# Patient Record
Sex: MALE | Race: WHITE | Employment: FULL TIME | URBAN - METROPOLITAN AREA
[De-identification: names, ages, dates, MRNs, and addresses within clinical notes are randomized per-mention and may not be internally consistent; named-entity substitution may affect disease eponyms.]

---

## 2023-04-24 LAB
A/G RATIO, EXTERNAL: NORMAL
ALBUMIN, EXTERNAL: 3.9
ALK PHOS, EXTERNAL: 90
ANION GAP, EXTERNAL: 7
BILI DIRECT, EXTERNAL: NORMAL
BILI TOTAL, EXTERNAL: 0.8
BUN, EXTERNAL: 20
BUN/CREATININE RATIO, EXTERNAL: NORMAL
CALCIUM, EXTERNAL: 8.8
CALCIUM, ION, EXTERNAL: NORMAL
CHLORIDE, EXTERNAL: 101
CO2, EXTERNAL: 30
CREATININE, EXTERNAL: 0.83
CREATININE, URINE: 76.44
EGFR IF AFA, EXTERNAL: NORMAL
EGFR IF NONAFRICAN AMERICAN: 91
GLOBULIN, EXTERNAL: NORMAL
GLUCOSE SER, EXTERNAL: 129
MICROALBUMIN/CREAT 24H UR: 0.3 MG/G{CREAT}
MICROALBUMIN/CREAT UR-RTO: 3.9
POTASSIUM, EXTERNAL: 4.7
PROTEIN TOT, EXTERNAL: 7.4
SGOT (AST), EXTERNAL: 30
SGPT (ALT), EXTERNAL: 23
SODIUM, EXTERNAL: 138

## 2023-08-02 ENCOUNTER — HOSPITAL ENCOUNTER (OUTPATIENT)
Age: 73
Discharge: HOME | End: 2023-08-02
Payer: MEDICARE

## 2023-08-02 LAB
ALBUMIN SERPL-MCNC: 4.2 G/DL (ref 3.5–5.2)
ALBUMIN/GLOB SERPL: 1.4 {RATIO} (ref 1–2.5)
ALP SERPL-CCNC: 87 U/L (ref 40–129)
ALT SERPL-CCNC: 22 U/L (ref 5–41)
AST SERPL-CCNC: 29 U/L
BILIRUB DIRECT SERPL-MCNC: 0.3 MG/DL
BILIRUB INDIRECT SERPL-MCNC: 0.9 MG/DL (ref 0–1)
BILIRUB SERPL-MCNC: 1.2 MG/DL (ref 0.3–1.2)
CHOLEST SERPL-MCNC: 74 MG/DL
CHOLESTEROL/HDL RATIO: 2.7
HDLC SERPL-MCNC: 27 MG/DL
LDLC SERPL CALC-MCNC: 30 MG/DL (ref 0–130)
PROT SERPL-MCNC: 7.1 G/DL (ref 6.4–8.3)
TRIGL SERPL-MCNC: 86 MG/DL

## 2023-08-02 PROCEDURE — 80061 LIPID PANEL: CPT

## 2023-08-02 PROCEDURE — 36415 COLL VENOUS BLD VENIPUNCTURE: CPT

## 2023-08-02 PROCEDURE — 80076 HEPATIC FUNCTION PANEL: CPT

## 2023-09-05 ENCOUNTER — HOSPITAL ENCOUNTER (OUTPATIENT)
Age: 73
Discharge: HOME | End: 2023-09-05
Payer: MEDICARE

## 2023-09-05 LAB
ALBUMIN SERPL-MCNC: 4.2 G/DL (ref 3.5–5.2)
ALBUMIN/GLOB SERPL: 1.4 {RATIO} (ref 1–2.5)
ALP SERPL-CCNC: 80 U/L (ref 40–129)
ALT SERPL-CCNC: 26 U/L (ref 5–41)
AST SERPL-CCNC: 32 U/L
BILIRUB DIRECT SERPL-MCNC: 0.2 MG/DL
BILIRUB INDIRECT SERPL-MCNC: 0.6 MG/DL (ref 0–1)
BILIRUB SERPL-MCNC: 0.8 MG/DL (ref 0.3–1.2)
CHOLEST SERPL-MCNC: 70 MG/DL
CHOLESTEROL/HDL RATIO: 2.5
HDLC SERPL-MCNC: 28 MG/DL
LDLC SERPL CALC-MCNC: 21 MG/DL (ref 0–130)
PROT SERPL-MCNC: 7.1 G/DL (ref 6.4–8.3)
TRIGL SERPL-MCNC: 103 MG/DL

## 2023-09-05 PROCEDURE — 36415 COLL VENOUS BLD VENIPUNCTURE: CPT

## 2023-09-05 PROCEDURE — 80061 LIPID PANEL: CPT

## 2023-09-05 PROCEDURE — 83036 HEMOGLOBIN GLYCOSYLATED A1C: CPT

## 2023-09-05 PROCEDURE — 80076 HEPATIC FUNCTION PANEL: CPT

## 2023-09-07 LAB
EST. AVERAGE GLUCOSE BLD GHB EST-MCNC: 128 MG/DL
HBA1C MFR BLD: 6.1 % (ref 4–6)

## 2023-10-09 ENCOUNTER — TELEPHONE (OUTPATIENT)
Age: 73
End: 2023-10-09

## 2023-10-09 NOTE — TELEPHONE ENCOUNTER
Patient would like the necessary labs before his 12/4/23 appointment. Patient will go [facility] when labs are ready.

## 2023-10-24 DIAGNOSIS — E11.9 DIABETES MELLITUS WITH NO COMPLICATION (HCC): ICD-10-CM

## 2023-10-24 DIAGNOSIS — E78.2 MIXED HYPERLIPIDEMIA: Primary | ICD-10-CM

## 2023-11-07 LAB — FECAL BLOOD IMMUNOCHEMICAL TEST: POSITIVE

## 2023-11-17 LAB
ESTIMATED AVERAGE GLUCOSE: NORMAL
HBA1C MFR BLD: 6.7 %

## 2023-11-22 DIAGNOSIS — E11.9 TYPE 2 DIABETES MELLITUS WITHOUT COMPLICATION, WITHOUT LONG-TERM CURRENT USE OF INSULIN (HCC): Primary | ICD-10-CM

## 2023-11-22 RX ORDER — LANCETS 33 GAUGE
1 EACH MISCELLANEOUS 2 TIMES DAILY
Qty: 100 EACH | Refills: 3 | Status: SHIPPED | OUTPATIENT
Start: 2023-11-22

## 2023-11-22 RX ORDER — LANCETS 33 GAUGE
1 EACH MISCELLANEOUS 2 TIMES DAILY
COMMUNITY
End: 2023-11-22 | Stop reason: SDUPTHER

## 2023-11-29 ENCOUNTER — HOSPITAL ENCOUNTER (OUTPATIENT)
Age: 73
Discharge: HOME | End: 2023-11-29
Payer: MEDICARE

## 2023-11-29 DIAGNOSIS — E78.2 MIXED HYPERLIPIDEMIA: ICD-10-CM

## 2023-11-29 DIAGNOSIS — E11.9 DIABETES MELLITUS WITH NO COMPLICATION (HCC): ICD-10-CM

## 2023-11-29 DIAGNOSIS — R19.5 OCCULT BLOOD POSITIVE STOOL: Primary | ICD-10-CM

## 2023-11-29 DIAGNOSIS — R19.5 OCCULT BLOOD POSITIVE STOOL: ICD-10-CM

## 2023-11-29 LAB
BASOPHILS # BLD: 0.06 K/UL (ref 0–0.2)
BASOPHILS NFR BLD: 1 % (ref 0–2)
CHOLEST SERPL-MCNC: 82 MG/DL
CHOLESTEROL/HDL RATIO: 2.7
EOSINOPHIL # BLD: 0.31 K/UL (ref 0–0.44)
EOSINOPHILS RELATIVE PERCENT: 4 % (ref 1–4)
ERYTHROCYTE [DISTWIDTH] IN BLOOD BY AUTOMATED COUNT: 14.7 % (ref 11.8–14.4)
GLUCOSE P FAST SERPL-MCNC: 139 MG/DL (ref 70–99)
HCT VFR BLD AUTO: 41.5 % (ref 40.7–50.3)
HDLC SERPL-MCNC: 30 MG/DL
HGB BLD-MCNC: 13.1 G/DL (ref 13–17)
IMM GRANULOCYTES # BLD AUTO: <0.03 K/UL (ref 0–0.3)
IMM GRANULOCYTES NFR BLD: 0 %
LDLC SERPL CALC-MCNC: 26 MG/DL (ref 0–130)
LYMPHOCYTES NFR BLD: 2.37 K/UL (ref 1.1–3.7)
LYMPHOCYTES RELATIVE PERCENT: 30 % (ref 24–43)
MCH RBC QN AUTO: 25.4 PG (ref 25.2–33.5)
MCHC RBC AUTO-ENTMCNC: 31.6 G/DL (ref 28.4–34.8)
MCV RBC AUTO: 80.6 FL (ref 82.6–102.9)
MONOCYTES NFR BLD: 0.47 K/UL (ref 0.1–1.2)
MONOCYTES NFR BLD: 6 % (ref 3–12)
NEUTROPHILS NFR BLD: 59 % (ref 36–65)
NEUTS SEG NFR BLD: 4.63 K/UL (ref 1.5–8.1)
NRBC BLD-RTO: 0 PER 100 WBC
PLATELET # BLD AUTO: 111 K/UL (ref 138–453)
PMV BLD AUTO: 8.8 FL (ref 8.1–13.5)
RBC # BLD AUTO: 5.15 M/UL (ref 4.21–5.77)
RBC # BLD: ABNORMAL 10*6/UL
TRIGL SERPL-MCNC: 128 MG/DL
WBC OTHER # BLD: 7.9 K/UL (ref 3.5–11.3)

## 2023-11-29 PROCEDURE — 36415 COLL VENOUS BLD VENIPUNCTURE: CPT

## 2023-11-29 PROCEDURE — 85025 COMPLETE CBC W/AUTO DIFF WBC: CPT

## 2023-11-29 PROCEDURE — 80061 LIPID PANEL: CPT

## 2023-11-29 PROCEDURE — 82947 ASSAY GLUCOSE BLOOD QUANT: CPT

## 2023-12-04 ENCOUNTER — OFFICE VISIT (OUTPATIENT)
Age: 73
End: 2023-12-04
Payer: MEDICARE

## 2023-12-04 VITALS
SYSTOLIC BLOOD PRESSURE: 140 MMHG | HEART RATE: 82 BPM | WEIGHT: 260 LBS | TEMPERATURE: 97.7 F | HEIGHT: 70 IN | DIASTOLIC BLOOD PRESSURE: 80 MMHG | BODY MASS INDEX: 37.22 KG/M2 | OXYGEN SATURATION: 97 %

## 2023-12-04 DIAGNOSIS — G47.33 OSA (OBSTRUCTIVE SLEEP APNEA): ICD-10-CM

## 2023-12-04 DIAGNOSIS — E66.01 MORBID OBESITY (HCC): Primary | ICD-10-CM

## 2023-12-04 DIAGNOSIS — R19.5 STOOL GUAIAC POSITIVE: ICD-10-CM

## 2023-12-04 DIAGNOSIS — M48.061 SPINAL STENOSIS OF LUMBAR REGION WITHOUT NEUROGENIC CLAUDICATION: ICD-10-CM

## 2023-12-04 DIAGNOSIS — G25.81 RESTLESS LEG SYNDROME: ICD-10-CM

## 2023-12-04 DIAGNOSIS — H90.3 SENSORINEURAL HEARING LOSS OF BOTH EARS: ICD-10-CM

## 2023-12-04 DIAGNOSIS — E78.2 MIXED HYPERLIPIDEMIA: ICD-10-CM

## 2023-12-04 DIAGNOSIS — I10 ESSENTIAL HYPERTENSION: ICD-10-CM

## 2023-12-04 DIAGNOSIS — I25.10 CORONARY ARTERY DISEASE INVOLVING NATIVE CORONARY ARTERY OF NATIVE HEART WITHOUT ANGINA PECTORIS: ICD-10-CM

## 2023-12-04 DIAGNOSIS — E11.9 TYPE 2 DIABETES MELLITUS WITHOUT COMPLICATION, WITHOUT LONG-TERM CURRENT USE OF INSULIN (HCC): ICD-10-CM

## 2023-12-04 PROCEDURE — 3077F SYST BP >= 140 MM HG: CPT | Performed by: INTERNAL MEDICINE

## 2023-12-04 PROCEDURE — 99214 OFFICE O/P EST MOD 30 MIN: CPT | Performed by: INTERNAL MEDICINE

## 2023-12-04 PROCEDURE — 3044F HG A1C LEVEL LT 7.0%: CPT | Performed by: INTERNAL MEDICINE

## 2023-12-04 PROCEDURE — 1123F ACP DISCUSS/DSCN MKR DOCD: CPT | Performed by: INTERNAL MEDICINE

## 2023-12-04 PROCEDURE — 3079F DIAST BP 80-89 MM HG: CPT | Performed by: INTERNAL MEDICINE

## 2023-12-04 RX ORDER — METOPROLOL SUCCINATE 25 MG/1
25 TABLET, EXTENDED RELEASE ORAL DAILY
COMMUNITY

## 2023-12-04 RX ORDER — EZETIMIBE 10 MG/1
10 TABLET ORAL DAILY
COMMUNITY

## 2023-12-04 RX ORDER — ROSUVASTATIN CALCIUM 20 MG/1
20 TABLET, COATED ORAL DAILY
COMMUNITY

## 2023-12-04 RX ORDER — ISOSORBIDE MONONITRATE 30 MG/1
30 TABLET, EXTENDED RELEASE ORAL DAILY
COMMUNITY

## 2023-12-04 SDOH — ECONOMIC STABILITY: FOOD INSECURITY: WITHIN THE PAST 12 MONTHS, YOU WORRIED THAT YOUR FOOD WOULD RUN OUT BEFORE YOU GOT MONEY TO BUY MORE.: NEVER TRUE

## 2023-12-04 SDOH — ECONOMIC STABILITY: INCOME INSECURITY: HOW HARD IS IT FOR YOU TO PAY FOR THE VERY BASICS LIKE FOOD, HOUSING, MEDICAL CARE, AND HEATING?: NOT VERY HARD

## 2023-12-04 SDOH — ECONOMIC STABILITY: FOOD INSECURITY: WITHIN THE PAST 12 MONTHS, THE FOOD YOU BOUGHT JUST DIDN'T LAST AND YOU DIDN'T HAVE MONEY TO GET MORE.: NEVER TRUE

## 2023-12-04 SDOH — ECONOMIC STABILITY: HOUSING INSECURITY
IN THE LAST 12 MONTHS, WAS THERE A TIME WHEN YOU DID NOT HAVE A STEADY PLACE TO SLEEP OR SLEPT IN A SHELTER (INCLUDING NOW)?: NO

## 2023-12-04 ASSESSMENT — ENCOUNTER SYMPTOMS
SINUS PAIN: 0
CONSTIPATION: 0
WHEEZING: 0
COUGH: 0
EYE PAIN: 0
BLOOD IN STOOL: 0
BACK PAIN: 0
SINUS PRESSURE: 0
VOMITING: 0
NAUSEA: 0
SORE THROAT: 0
SHORTNESS OF BREATH: 0
DIARRHEA: 0
RHINORRHEA: 0
EYE REDNESS: 0
ABDOMINAL PAIN: 0

## 2023-12-04 ASSESSMENT — PATIENT HEALTH QUESTIONNAIRE - PHQ9
1. LITTLE INTEREST OR PLEASURE IN DOING THINGS: 0
SUM OF ALL RESPONSES TO PHQ QUESTIONS 1-9: 0
SUM OF ALL RESPONSES TO PHQ9 QUESTIONS 1 & 2: 0
2. FEELING DOWN, DEPRESSED OR HOPELESS: 0
SUM OF ALL RESPONSES TO PHQ QUESTIONS 1-9: 0

## 2023-12-04 NOTE — PROGRESS NOTES
[facility]     Date of Visit:  2023  Patient Name: Nathaly Gray   Patient :  1950     CHIEF COMPLAINT:     Nathaly Gray is a 67 y.o. male who presents today for an general visit to be evaluated for the following condition(s):  Chief Complaint   Patient presents with    Discuss Labs       HISTORY OF PRESENT ILLNESS       Pt stopped wt watchers. up 13 lb. Last A1C 6.1 in Sept.  Avg . Range . Pt's wife is at [address] [place] [address]. Issues with portion control  Swimming. REVIEW OF SYSTEMS     Review of Systems   Constitutional:  Negative for chills, fever and unexpected weight change. HENT:  Negative for congestion, ear pain, hearing loss, rhinorrhea, sinus pressure, sinus pain, sneezing and sore throat. Eyes:  Negative for pain, redness and visual disturbance. Respiratory:  Negative for cough, shortness of breath and wheezing. Cardiovascular:  Negative for chest pain, palpitations and leg swelling. Gastrointestinal:  Negative for abdominal pain, blood in stool, constipation, diarrhea, nausea and vomiting. Endocrine: Negative for cold intolerance and heat intolerance. Genitourinary:  Negative for difficulty urinating, dysuria, frequency, hematuria and urgency. Musculoskeletal:  Negative for arthralgias, back pain, gait problem, joint swelling, myalgias and neck pain. Skin:  Negative for rash and wound. Allergic/Immunologic: Negative for immunocompromised state. Neurological:  Negative for dizziness, tremors, seizures, syncope, speech difficulty, weakness, light-headedness, numbness and headaches. Psychiatric/Behavioral:  Negative for confusion, hallucinations and sleep disturbance. The patient is not nervous/anxious.          REVIEWED INFORMATION      Current Outpatient Medications   Medication Sig Dispense Refill    metoprolol succinate (TOPROL XL) 25 MG extended release tablet Take 1 tablet by mouth daily      rosuvastatin (CRESTOR) 20 MG tablet Take 1

## 2024-01-05 ENCOUNTER — HOSPITAL ENCOUNTER (OUTPATIENT)
Age: 74
Discharge: HOME | End: 2024-01-05
Payer: MEDICARE

## 2024-01-05 DIAGNOSIS — E11.9 TYPE 2 DIABETES MELLITUS WITHOUT COMPLICATION, WITHOUT LONG-TERM CURRENT USE OF INSULIN (HCC): ICD-10-CM

## 2024-01-05 LAB
EST. AVERAGE GLUCOSE BLD GHB EST-MCNC: 137 MG/DL
HBA1C MFR BLD: 6.4 % (ref 4–6)

## 2024-01-05 PROCEDURE — 36415 COLL VENOUS BLD VENIPUNCTURE: CPT

## 2024-01-05 PROCEDURE — 83036 HEMOGLOBIN GLYCOSYLATED A1C: CPT

## 2024-01-29 NOTE — TELEPHONE ENCOUNTER
Miquel Cheung is calling to request a refill on the following medication(s):    Last Visit Date (If Applicable):  12/4/2023    Next Visit Date:    3/12/2024    Medication Request:  Requested Prescriptions     Pending Prescriptions Disp Refills    amLODIPine-benazepril (LOTREL) 5-20 MG per capsule [Pharmacy Med Name: amLODIPine-BENAZEPRIL 5-20 MG CAP] 90 capsule 3     Sig: TAKE ONE CAPSULE BY MOUTH DAILY

## 2024-02-01 RX ORDER — AMLODIPINE BESYLATE AND BENAZEPRIL HYDROCHLORIDE 5; 20 MG/1; MG/1
1 CAPSULE ORAL DAILY
Qty: 90 CAPSULE | Refills: 3 | Status: SHIPPED | OUTPATIENT
Start: 2024-02-01

## 2024-03-01 ENCOUNTER — TELEPHONE (OUTPATIENT)
Age: 74
End: 2024-03-01

## 2024-03-01 NOTE — TELEPHONE ENCOUNTER
Patient would like all the necessary lab work done before his next appointment on March 12, 2024. Patient will go to [facility].  Please call patient when labs are ready.

## 2024-03-12 ENCOUNTER — OFFICE VISIT (OUTPATIENT)
Age: 74
End: 2024-03-12
Payer: MEDICARE

## 2024-03-12 VITALS
BODY MASS INDEX: 37.8 KG/M2 | TEMPERATURE: 96.9 F | HEART RATE: 60 BPM | HEIGHT: 70 IN | SYSTOLIC BLOOD PRESSURE: 132 MMHG | OXYGEN SATURATION: 98 % | DIASTOLIC BLOOD PRESSURE: 80 MMHG | WEIGHT: 264 LBS

## 2024-03-12 DIAGNOSIS — G25.81 RESTLESS LEG SYNDROME: ICD-10-CM

## 2024-03-12 DIAGNOSIS — M48.061 SPINAL STENOSIS OF LUMBAR REGION WITHOUT NEUROGENIC CLAUDICATION: ICD-10-CM

## 2024-03-12 DIAGNOSIS — E78.2 MIXED HYPERLIPIDEMIA: ICD-10-CM

## 2024-03-12 DIAGNOSIS — I10 ESSENTIAL HYPERTENSION: ICD-10-CM

## 2024-03-12 DIAGNOSIS — E66.01 MORBID OBESITY (HCC): ICD-10-CM

## 2024-03-12 DIAGNOSIS — Z86.16 HISTORY OF COVID-19: ICD-10-CM

## 2024-03-12 DIAGNOSIS — E11.9 TYPE 2 DIABETES MELLITUS WITHOUT COMPLICATION, WITHOUT LONG-TERM CURRENT USE OF INSULIN (HCC): ICD-10-CM

## 2024-03-12 DIAGNOSIS — I25.10 CORONARY ARTERY DISEASE INVOLVING NATIVE CORONARY ARTERY OF NATIVE HEART WITHOUT ANGINA PECTORIS: ICD-10-CM

## 2024-03-12 DIAGNOSIS — H90.3 SENSORINEURAL HEARING LOSS OF BOTH EARS: ICD-10-CM

## 2024-03-12 DIAGNOSIS — Z00.00 MEDICARE ANNUAL WELLNESS VISIT, SUBSEQUENT: Primary | ICD-10-CM

## 2024-03-12 DIAGNOSIS — G47.33 OSA (OBSTRUCTIVE SLEEP APNEA): ICD-10-CM

## 2024-03-12 PROCEDURE — 1123F ACP DISCUSS/DSCN MKR DOCD: CPT | Performed by: INTERNAL MEDICINE

## 2024-03-12 PROCEDURE — G0439 PPPS, SUBSEQ VISIT: HCPCS | Performed by: INTERNAL MEDICINE

## 2024-03-12 PROCEDURE — 99214 OFFICE O/P EST MOD 30 MIN: CPT | Performed by: INTERNAL MEDICINE

## 2024-03-12 SDOH — ECONOMIC STABILITY: FOOD INSECURITY: WITHIN THE PAST 12 MONTHS, THE FOOD YOU BOUGHT JUST DIDN'T LAST AND YOU DIDN'T HAVE MONEY TO GET MORE.: NEVER TRUE

## 2024-03-12 SDOH — ECONOMIC STABILITY: INCOME INSECURITY: HOW HARD IS IT FOR YOU TO PAY FOR THE VERY BASICS LIKE FOOD, HOUSING, MEDICAL CARE, AND HEATING?: NOT HARD AT ALL

## 2024-03-12 SDOH — ECONOMIC STABILITY: FOOD INSECURITY: WITHIN THE PAST 12 MONTHS, YOU WORRIED THAT YOUR FOOD WOULD RUN OUT BEFORE YOU GOT MONEY TO BUY MORE.: NEVER TRUE

## 2024-03-12 ASSESSMENT — ANXIETY QUESTIONNAIRES
1. FEELING NERVOUS, ANXIOUS, OR ON EDGE: 0
5. BEING SO RESTLESS THAT IT IS HARD TO SIT STILL: 0
3. WORRYING TOO MUCH ABOUT DIFFERENT THINGS: 0
7. FEELING AFRAID AS IF SOMETHING AWFUL MIGHT HAPPEN: 0
4. TROUBLE RELAXING: 0
2. NOT BEING ABLE TO STOP OR CONTROL WORRYING: 0
GAD7 TOTAL SCORE: 0
6. BECOMING EASILY ANNOYED OR IRRITABLE: 0

## 2024-03-12 ASSESSMENT — ENCOUNTER SYMPTOMS
CONSTIPATION: 0
RHINORRHEA: 0
DIARRHEA: 0
VOMITING: 0
SINUS PRESSURE: 0
BACK PAIN: 0
SINUS PAIN: 0
BLOOD IN STOOL: 0
ABDOMINAL PAIN: 0
EYE PAIN: 0
EYE REDNESS: 0
NAUSEA: 0
SORE THROAT: 0
COUGH: 0
WHEEZING: 0
SHORTNESS OF BREATH: 0

## 2024-03-12 ASSESSMENT — PATIENT HEALTH QUESTIONNAIRE - PHQ9
SUM OF ALL RESPONSES TO PHQ QUESTIONS 1-9: 0
1. LITTLE INTEREST OR PLEASURE IN DOING THINGS: 0
SUM OF ALL RESPONSES TO PHQ QUESTIONS 1-9: 0
SUM OF ALL RESPONSES TO PHQ9 QUESTIONS 1 & 2: 0
2. FEELING DOWN, DEPRESSED OR HOPELESS: 0

## 2024-03-12 ASSESSMENT — LIFESTYLE VARIABLES
HOW OFTEN DO YOU HAVE A DRINK CONTAINING ALCOHOL: NEVER
HOW MANY STANDARD DRINKS CONTAINING ALCOHOL DO YOU HAVE ON A TYPICAL DAY: PATIENT DOES NOT DRINK

## 2024-03-12 NOTE — PROGRESS NOTES
[facility]     Date of Visit:  3/12/2024  Patient Name: Miquel Cheung   Patient :  1950     CHIEF COMPLAINT:     Miquel Cheung is a 73 y.o. male who presents today for an general visit to be evaluated for the following condition(s):  Chief Complaint   Patient presents with    Medicare AWV       HISTORY OF PRESENT ILLNESS      Patient continues to exercise daily, walking, treadmill, swimming, biking.  No chest pain or shortness of breath.  Average home blood pressure 121/65 with average blood sugar 138, low of 89 and high 167.  No issues of medications.  Wife is at assisted living at [place] [facility]. Pt visits her daily.  Only sleeping 5 hr/night.     Mid July, will be in [place] for a [place] history tour.     REVIEW OF SYSTEMS     Review of Systems   Constitutional:  Negative for chills, fever and unexpected weight change.   HENT:  Negative for congestion, ear pain, hearing loss, rhinorrhea, sinus pressure, sinus pain, sneezing and sore throat.    Eyes:  Negative for pain, redness and visual disturbance.   Respiratory:  Negative for cough, shortness of breath and wheezing.    Cardiovascular:  Negative for chest pain, palpitations and leg swelling.   Gastrointestinal:  Negative for abdominal pain, blood in stool, constipation, diarrhea, nausea and vomiting.   Endocrine: Negative for cold intolerance and heat intolerance.   Genitourinary:  Negative for difficulty urinating, dysuria, frequency, hematuria and urgency.   Musculoskeletal:  Negative for arthralgias, back pain, gait problem, joint swelling, myalgias and neck pain.   Skin:  Negative for rash and wound.   Allergic/Immunologic: Negative for immunocompromised state.   Neurological:  Negative for dizziness, tremors, seizures, syncope, speech difficulty, weakness, light-headedness, numbness and headaches.   Psychiatric/Behavioral:  Negative for confusion, hallucinations and sleep disturbance. The patient is not nervous/anxious.

## 2024-03-18 DIAGNOSIS — I10 ESSENTIAL HYPERTENSION: Primary | ICD-10-CM

## 2024-03-18 NOTE — TELEPHONE ENCOUNTER
Miquel Cheung is calling to request a refill on the following medication(s):    Last Visit Date (If Applicable):  3/12/2024    Next Visit Date:    9/19/2024    Medication Request:  Requested Prescriptions      No prescriptions requested or ordered in this encounter            Metoprolol succinate 25 mg once daily

## 2024-03-18 NOTE — TELEPHONE ENCOUNTER
metoprolol succinate (TOPROL XL) 25 MG extended release tablet  Take 1 tablet by mouth daily,     90 Day supply

## 2024-03-19 ENCOUNTER — TELEPHONE (OUTPATIENT)
Age: 74
End: 2024-03-19

## 2024-03-19 NOTE — TELEPHONE ENCOUNTER
Patient is requesting a referral for a Ophthalmologist. The patient would like a referral for [facility].      Fax  [phone]

## 2024-03-21 RX ORDER — METOPROLOL SUCCINATE 25 MG/1
25 TABLET, EXTENDED RELEASE ORAL DAILY
Qty: 90 TABLET | Refills: 3 | Status: SHIPPED | OUTPATIENT
Start: 2024-03-21

## 2024-04-02 ENCOUNTER — OFFICE VISIT (OUTPATIENT)
Age: 74
End: 2024-04-02
Payer: MEDICARE

## 2024-04-02 VITALS
BODY MASS INDEX: 38.02 KG/M2 | HEIGHT: 70 IN | HEART RATE: 80 BPM | WEIGHT: 265.6 LBS | TEMPERATURE: 97.4 F | SYSTOLIC BLOOD PRESSURE: 120 MMHG | OXYGEN SATURATION: 96 % | DIASTOLIC BLOOD PRESSURE: 60 MMHG

## 2024-04-02 DIAGNOSIS — B35.6 TINEA CRURIS: Primary | ICD-10-CM

## 2024-04-02 DIAGNOSIS — I10 ESSENTIAL HYPERTENSION: ICD-10-CM

## 2024-04-02 DIAGNOSIS — E11.9 TYPE 2 DIABETES MELLITUS WITHOUT COMPLICATION, WITHOUT LONG-TERM CURRENT USE OF INSULIN (HCC): ICD-10-CM

## 2024-04-02 PROCEDURE — 3078F DIAST BP <80 MM HG: CPT | Performed by: PHYSICIAN ASSISTANT

## 2024-04-02 PROCEDURE — 99213 OFFICE O/P EST LOW 20 MIN: CPT | Performed by: PHYSICIAN ASSISTANT

## 2024-04-02 PROCEDURE — 3074F SYST BP LT 130 MM HG: CPT | Performed by: PHYSICIAN ASSISTANT

## 2024-04-02 PROCEDURE — 3044F HG A1C LEVEL LT 7.0%: CPT | Performed by: PHYSICIAN ASSISTANT

## 2024-04-02 PROCEDURE — 1123F ACP DISCUSS/DSCN MKR DOCD: CPT | Performed by: PHYSICIAN ASSISTANT

## 2024-04-02 RX ORDER — NYSTATIN 100000 [USP'U]/G
POWDER TOPICAL
Qty: 30 G | Refills: 1 | Status: SHIPPED | OUTPATIENT
Start: 2024-04-02

## 2024-04-02 RX ORDER — CLOTRIMAZOLE 1 %
CREAM (GRAM) TOPICAL
Qty: 45 G | Refills: 1 | Status: SHIPPED | OUTPATIENT
Start: 2024-04-02 | End: 2024-04-09

## 2024-04-02 ASSESSMENT — ENCOUNTER SYMPTOMS
SORE THROAT: 0
EYE REDNESS: 0
SINUS PAIN: 0
RHINORRHEA: 0
BLOOD IN STOOL: 0
COUGH: 0
ABDOMINAL PAIN: 0
NAUSEA: 0
VOMITING: 0
SHORTNESS OF BREATH: 0
DIARRHEA: 0
CONSTIPATION: 0
BACK PAIN: 0
EYE PAIN: 0
SINUS PRESSURE: 0

## 2024-04-02 NOTE — PROGRESS NOTES
heard.     No friction rub. No gallop.   Pulmonary:      Effort: Pulmonary effort is normal. No respiratory distress.      Breath sounds: Normal breath sounds. No wheezing, rhonchi or rales.   Abdominal:      General: Bowel sounds are normal. There is no distension.      Palpations: Abdomen is soft. There is no mass.      Tenderness: There is no abdominal tenderness. There is no guarding.   Musculoskeletal:         General: No swelling or deformity. Normal range of motion.      Cervical back: Normal range of motion and neck supple. No rigidity.   Lymphadenopathy:      Cervical: No cervical adenopathy.   Skin:     General: Skin is warm.      Coloration: Skin is not jaundiced or pale.      Findings: Rash (well demarcated rash bilat groin, slightly red. consitent with tinea cruris) present. No bruising.   Neurological:      General: No focal deficit present.      Mental Status: He is alert and oriented to person, place, and time.      Cranial Nerves: No cranial nerve deficit.      Motor: No weakness.      Gait: Gait normal.      Deep Tendon Reflexes: Reflexes normal.   Psychiatric:         Mood and Affect: Mood normal.         Thought Content: Thought content normal.         The ASCVD Risk score (Premium DK, et al., 2019) failed to calculate for the following reasons:    The valid total cholesterol range is 130 to 320 mg/dL     Results for orders placed or performed during the hospital encounter of 01/05/24   Hemoglobin A1C   Result Value Ref Range    Hemoglobin A1C 6.4 (H) 4.0 - 6.0 %    Estimated Avg Glucose 137 mg/dL      ASSESSMENT/PLAN     1. Tinea cruris  -     clotrimazole (LOTRIMIN AF) 1 % cream; Apply topically 2 times daily., Disp-45 g, R-1, Normal  -     nystatin (MYCOSTATIN) 786395 UNIT/GM powder; Apply 3 times daily., Disp-30 g, R-1, Normal  Lotrimin cream to use twice a day for 10 days.  That should take care of the rash.  After acute rash resolves, he can then use nystatin powder as needed to keep the

## 2024-04-11 PROBLEM — Z00.00 MEDICARE ANNUAL WELLNESS VISIT, SUBSEQUENT: Status: RESOLVED | Noted: 2024-03-12 | Resolved: 2024-04-11

## 2024-04-26 ENCOUNTER — TELEPHONE (OUTPATIENT)
Age: 74
End: 2024-04-26

## 2024-04-26 DIAGNOSIS — E78.2 MIXED HYPERLIPIDEMIA: Primary | ICD-10-CM

## 2024-04-26 NOTE — TELEPHONE ENCOUNTER
Miquel Cheung is calling to request a refill on the following medication(s):    Last Visit Date (If Applicable):  3/12/2024    Next Visit Date:    9/19/2024    Medication Request:  Requested Prescriptions      No prescriptions requested or ordered in this encounter       Ezetimibe 10 mg one tablet daily

## 2024-04-28 RX ORDER — EZETIMIBE 10 MG/1
10 TABLET ORAL DAILY
Qty: 90 TABLET | Refills: 1 | Status: SHIPPED | OUTPATIENT
Start: 2024-04-28

## 2024-05-09 ENCOUNTER — OFFICE VISIT (OUTPATIENT)
Age: 74
End: 2024-05-09
Payer: MEDICARE

## 2024-05-09 VITALS
HEART RATE: 66 BPM | WEIGHT: 266 LBS | OXYGEN SATURATION: 96 % | TEMPERATURE: 97 F | DIASTOLIC BLOOD PRESSURE: 70 MMHG | HEIGHT: 69 IN | SYSTOLIC BLOOD PRESSURE: 132 MMHG | BODY MASS INDEX: 39.4 KG/M2

## 2024-05-09 DIAGNOSIS — B35.4 TINEA CORPORIS: Primary | ICD-10-CM

## 2024-05-09 DIAGNOSIS — E11.9 TYPE 2 DIABETES MELLITUS WITHOUT COMPLICATION, WITHOUT LONG-TERM CURRENT USE OF INSULIN (HCC): ICD-10-CM

## 2024-05-09 DIAGNOSIS — I10 ESSENTIAL HYPERTENSION: ICD-10-CM

## 2024-05-09 PROCEDURE — 3044F HG A1C LEVEL LT 7.0%: CPT | Performed by: PHYSICIAN ASSISTANT

## 2024-05-09 PROCEDURE — 99213 OFFICE O/P EST LOW 20 MIN: CPT | Performed by: PHYSICIAN ASSISTANT

## 2024-05-09 PROCEDURE — 3075F SYST BP GE 130 - 139MM HG: CPT | Performed by: PHYSICIAN ASSISTANT

## 2024-05-09 PROCEDURE — 1123F ACP DISCUSS/DSCN MKR DOCD: CPT | Performed by: PHYSICIAN ASSISTANT

## 2024-05-09 PROCEDURE — 3078F DIAST BP <80 MM HG: CPT | Performed by: PHYSICIAN ASSISTANT

## 2024-05-09 RX ORDER — CLOTRIMAZOLE AND BETAMETHASONE DIPROPIONATE 10; .64 MG/G; MG/G
CREAM TOPICAL
Qty: 45 G | Refills: 0 | Status: SHIPPED | OUTPATIENT
Start: 2024-05-09

## 2024-05-09 ASSESSMENT — ENCOUNTER SYMPTOMS
RHINORRHEA: 0
BLOOD IN STOOL: 0
DIARRHEA: 0
EYE REDNESS: 0
COUGH: 0
ABDOMINAL PAIN: 0
SORE THROAT: 0
SHORTNESS OF BREATH: 0
SINUS PAIN: 0
SINUS PRESSURE: 0
EYE PAIN: 0
NAUSEA: 0
CONSTIPATION: 0
VOMITING: 0
BACK PAIN: 0
WHEEZING: 0

## 2024-05-09 NOTE — PROGRESS NOTES
controlled with his last A1c at 6.4.  He is on diet only no medication.  3. Essential hypertension  Blood pressure stable and controlled with metoprolol succinate 25 mg daily and amlodipine benazepril 5-20  Also on Imdur 30 mg daily.  No changes.    Return for keep upcoming appointment.    COMMUNICATION:       Electronically signed by Cassie Meneses PA-C on 5/9/2024 at 11:33 AM

## 2024-05-14 DIAGNOSIS — I10 ESSENTIAL HYPERTENSION: ICD-10-CM

## 2024-05-14 DIAGNOSIS — E78.2 MIXED HYPERLIPIDEMIA: ICD-10-CM

## 2024-05-29 DIAGNOSIS — B35.4 TINEA CORPORIS: ICD-10-CM

## 2024-05-29 RX ORDER — CLOTRIMAZOLE AND BETAMETHASONE DIPROPIONATE 10; .64 MG/G; MG/G
CREAM TOPICAL
Qty: 45 G | Refills: 1 | Status: SHIPPED | OUTPATIENT
Start: 2024-05-29

## 2024-05-29 NOTE — TELEPHONE ENCOUNTER
Miquel Cheung is calling to request a refill on the following medication(s):    Last Visit Date (If Applicable):  5/9/2024    Next Visit Date:    Visit date not found    Medication Request:  Requested Prescriptions     Pending Prescriptions Disp Refills    clotrimazole-betamethasone (LOTRISONE) 1-0.05 % cream [Pharmacy Med Name: CLOTRIMAZOLE-BETAMETH 1-0.05% CREAM] 45 g 0     Sig: APPLY TO THE AFFECTED AREA(S) 2 TIMES A DAY

## 2024-06-15 DIAGNOSIS — E11.9 TYPE 2 DIABETES MELLITUS WITHOUT COMPLICATION, WITHOUT LONG-TERM CURRENT USE OF INSULIN (HCC): Primary | ICD-10-CM

## 2024-06-17 NOTE — TELEPHONE ENCOUNTER
Miquel Cheung is calling to request a refill on the following medication(s):    Last Visit Date (If Applicable):  3/12/2024    Next Visit Date:    2024    Medication Request:  Requested Prescriptions     Pending Prescriptions Disp Refills    blood glucose test strips (ONETOUCH ULTRA) strip [Pharmacy Med Name: ONETOUCH ULTRA TEST STRIP] 100 strip 3     Si each by In Vitro route every morning

## 2024-06-18 RX ORDER — BLOOD SUGAR DIAGNOSTIC
1 STRIP MISCELLANEOUS EVERY MORNING
Qty: 100 STRIP | Refills: 3 | Status: SHIPPED | OUTPATIENT
Start: 2024-06-18

## 2024-07-19 LAB
ESTIMATED AVERAGE GLUCOSE: 154
HBA1C MFR BLD: 7 %

## 2024-07-22 DIAGNOSIS — E11.9 TYPE 2 DIABETES MELLITUS WITHOUT COMPLICATION, WITHOUT LONG-TERM CURRENT USE OF INSULIN (HCC): ICD-10-CM

## 2024-09-19 ENCOUNTER — OFFICE VISIT (OUTPATIENT)
Age: 74
End: 2024-09-19
Payer: MEDICARE

## 2024-09-19 VITALS
SYSTOLIC BLOOD PRESSURE: 126 MMHG | HEART RATE: 80 BPM | DIASTOLIC BLOOD PRESSURE: 76 MMHG | BODY MASS INDEX: 39.28 KG/M2 | HEIGHT: 69 IN | OXYGEN SATURATION: 96 % | TEMPERATURE: 96.8 F

## 2024-09-19 DIAGNOSIS — E78.2 MIXED HYPERLIPIDEMIA: ICD-10-CM

## 2024-09-19 DIAGNOSIS — E11.9 ENCOUNTER FOR DIABETIC FOOT EXAM (HCC): ICD-10-CM

## 2024-09-19 DIAGNOSIS — H90.3 SENSORINEURAL HEARING LOSS OF BOTH EARS: ICD-10-CM

## 2024-09-19 DIAGNOSIS — B35.4 TINEA CORPORIS: ICD-10-CM

## 2024-09-19 DIAGNOSIS — E11.9 TYPE 2 DIABETES MELLITUS WITHOUT COMPLICATION, WITHOUT LONG-TERM CURRENT USE OF INSULIN (HCC): ICD-10-CM

## 2024-09-19 DIAGNOSIS — I10 ESSENTIAL HYPERTENSION: ICD-10-CM

## 2024-09-19 DIAGNOSIS — Z13.6 ENCOUNTER FOR ABDOMINAL AORTIC ANEURYSM SCREENING: ICD-10-CM

## 2024-09-19 DIAGNOSIS — I25.10 CORONARY ARTERY DISEASE INVOLVING NATIVE CORONARY ARTERY OF NATIVE HEART WITHOUT ANGINA PECTORIS: ICD-10-CM

## 2024-09-19 DIAGNOSIS — G47.33 OSA (OBSTRUCTIVE SLEEP APNEA): ICD-10-CM

## 2024-09-19 DIAGNOSIS — E66.01 MORBID OBESITY (HCC): Primary | ICD-10-CM

## 2024-09-19 DIAGNOSIS — Z23 IMMUNIZATION DUE: ICD-10-CM

## 2024-09-19 DIAGNOSIS — G25.81 RESTLESS LEG SYNDROME: ICD-10-CM

## 2024-09-19 DIAGNOSIS — M48.061 SPINAL STENOSIS OF LUMBAR REGION WITHOUT NEUROGENIC CLAUDICATION: ICD-10-CM

## 2024-09-19 PROCEDURE — 3078F DIAST BP <80 MM HG: CPT | Performed by: INTERNAL MEDICINE

## 2024-09-19 PROCEDURE — 90653 IIV ADJUVANT VACCINE IM: CPT | Performed by: INTERNAL MEDICINE

## 2024-09-19 PROCEDURE — 3074F SYST BP LT 130 MM HG: CPT | Performed by: INTERNAL MEDICINE

## 2024-09-19 PROCEDURE — 99214 OFFICE O/P EST MOD 30 MIN: CPT | Performed by: INTERNAL MEDICINE

## 2024-09-19 PROCEDURE — G0008 ADMIN INFLUENZA VIRUS VAC: HCPCS | Performed by: INTERNAL MEDICINE

## 2024-09-19 PROCEDURE — 76706 US ABDL AORTA SCREEN AAA: CPT | Performed by: INTERNAL MEDICINE

## 2024-09-19 PROCEDURE — 1123F ACP DISCUSS/DSCN MKR DOCD: CPT | Performed by: INTERNAL MEDICINE

## 2024-09-19 PROCEDURE — 3051F HG A1C>EQUAL 7.0%<8.0%: CPT | Performed by: INTERNAL MEDICINE

## 2024-09-19 RX ORDER — CLOTRIMAZOLE 1 %
CREAM (GRAM) TOPICAL
Qty: 60 G | Refills: 0 | Status: SHIPPED | OUTPATIENT
Start: 2024-09-19 | End: 2024-09-26

## 2024-09-19 ASSESSMENT — ENCOUNTER SYMPTOMS
VOMITING: 0
SHORTNESS OF BREATH: 0
SINUS PAIN: 0
DIARRHEA: 0
SORE THROAT: 0
BLOOD IN STOOL: 0
EYE REDNESS: 0
WHEEZING: 0
NAUSEA: 0
CONSTIPATION: 0
BACK PAIN: 0
EYE PAIN: 0
RHINORRHEA: 0
COUGH: 0
ABDOMINAL PAIN: 0
SINUS PRESSURE: 0

## 2024-09-25 RX ORDER — EZETIMIBE 10 MG/1
10 TABLET ORAL DAILY
Qty: 90 TABLET | Refills: 3 | Status: SHIPPED | OUTPATIENT
Start: 2024-09-25

## 2024-09-26 LAB
CREATININE URINE: 182.6 MG/DL
MICROALBUMIN/CREAT 24H UR: 1.41 MG/DL
MICROALBUMIN/CREAT UR-RTO: 7.7 MG/G

## 2024-09-27 DIAGNOSIS — E11.9 TYPE 2 DIABETES MELLITUS WITHOUT COMPLICATION, WITHOUT LONG-TERM CURRENT USE OF INSULIN (HCC): ICD-10-CM

## 2024-10-01 DIAGNOSIS — E11.9 TYPE 2 DIABETES MELLITUS WITHOUT COMPLICATION, WITHOUT LONG-TERM CURRENT USE OF INSULIN (HCC): ICD-10-CM

## 2024-10-01 DIAGNOSIS — E78.2 MIXED HYPERLIPIDEMIA: ICD-10-CM

## 2024-10-01 LAB
ESTIMATED AVERAGE GLUCOSE: 148
HBA1C MFR BLD: 6.8 %

## 2024-10-30 ENCOUNTER — OFFICE VISIT (OUTPATIENT)
Age: 74
End: 2024-10-30
Payer: MEDICARE

## 2024-10-30 VITALS
HEIGHT: 69 IN | WEIGHT: 265 LBS | OXYGEN SATURATION: 96 % | TEMPERATURE: 96.8 F | BODY MASS INDEX: 39.25 KG/M2 | SYSTOLIC BLOOD PRESSURE: 120 MMHG | DIASTOLIC BLOOD PRESSURE: 60 MMHG | HEART RATE: 70 BPM

## 2024-10-30 DIAGNOSIS — E78.6 LOW HDL (UNDER 40): ICD-10-CM

## 2024-10-30 DIAGNOSIS — G25.81 RESTLESS LEG SYNDROME: ICD-10-CM

## 2024-10-30 DIAGNOSIS — M48.061 SPINAL STENOSIS OF LUMBAR REGION WITHOUT NEUROGENIC CLAUDICATION: ICD-10-CM

## 2024-10-30 DIAGNOSIS — I10 ESSENTIAL HYPERTENSION: ICD-10-CM

## 2024-10-30 DIAGNOSIS — E11.9 TYPE 2 DIABETES MELLITUS WITHOUT COMPLICATION, WITHOUT LONG-TERM CURRENT USE OF INSULIN (HCC): ICD-10-CM

## 2024-10-30 DIAGNOSIS — E66.01 MORBID OBESITY: Primary | ICD-10-CM

## 2024-10-30 DIAGNOSIS — H90.3 SENSORINEURAL HEARING LOSS OF BOTH EARS: ICD-10-CM

## 2024-10-30 DIAGNOSIS — G47.33 OSA (OBSTRUCTIVE SLEEP APNEA): ICD-10-CM

## 2024-10-30 DIAGNOSIS — B35.6 TINEA CRURIS: ICD-10-CM

## 2024-10-30 DIAGNOSIS — I25.10 CORONARY ARTERY DISEASE INVOLVING NATIVE CORONARY ARTERY OF NATIVE HEART WITHOUT ANGINA PECTORIS: ICD-10-CM

## 2024-10-30 PROCEDURE — 3074F SYST BP LT 130 MM HG: CPT | Performed by: INTERNAL MEDICINE

## 2024-10-30 PROCEDURE — 3078F DIAST BP <80 MM HG: CPT | Performed by: INTERNAL MEDICINE

## 2024-10-30 PROCEDURE — 3044F HG A1C LEVEL LT 7.0%: CPT | Performed by: INTERNAL MEDICINE

## 2024-10-30 PROCEDURE — 99214 OFFICE O/P EST MOD 30 MIN: CPT | Performed by: INTERNAL MEDICINE

## 2024-10-30 PROCEDURE — 1123F ACP DISCUSS/DSCN MKR DOCD: CPT | Performed by: INTERNAL MEDICINE

## 2024-10-30 RX ORDER — ROSUVASTATIN CALCIUM 10 MG/1
10 TABLET, COATED ORAL DAILY
Qty: 90 TABLET | Refills: 1 | Status: SHIPPED | OUTPATIENT
Start: 2024-10-30

## 2024-10-30 ASSESSMENT — ENCOUNTER SYMPTOMS
SORE THROAT: 0
WHEEZING: 0
BLOOD IN STOOL: 0
SINUS PRESSURE: 0
RHINORRHEA: 0
CONSTIPATION: 0
SHORTNESS OF BREATH: 0
ABDOMINAL PAIN: 0
EYE PAIN: 0
SINUS PAIN: 0
COUGH: 0
NAUSEA: 0
BACK PAIN: 0
VOMITING: 0
EYE REDNESS: 0
DIARRHEA: 0

## 2024-10-30 NOTE — PROGRESS NOTES
[facility]     Date of Visit:  10/30/2024  Patient Name: Miquel Cheung   Patient :  1950     CHIEF COMPLAINT:     Miquel Cheung is a 73 y.o. male who presents today for an general visit to be evaluated for the following condition(s):  Chief Complaint   Patient presents with    Rash     Follow up jock itch, not getting any better       HISTORY OF PRESENT ILLNESS      Pt notes rash Right groin, better with clorimazole cream prn and nystatin powder. Exercises 35 min daily. Recumbant exercise bike. No CP or Sob.  Wife is at [place]. Labs reviewed. A1C 6.8. Taking all meds. Home BS avg 140.    Bikes 11 miles, swims twice a week as well    REVIEW OF SYSTEMS     Review of Systems   Constitutional:  Negative for chills, fever and unexpected weight change.   HENT:  Negative for congestion, ear pain, hearing loss, rhinorrhea, sinus pressure, sinus pain, sneezing and sore throat.    Eyes:  Negative for pain, redness and visual disturbance.   Respiratory:  Negative for cough, shortness of breath and wheezing.    Cardiovascular:  Negative for chest pain, palpitations and leg swelling.   Gastrointestinal:  Negative for abdominal pain, blood in stool, constipation, diarrhea, nausea and vomiting.   Endocrine: Negative for cold intolerance and heat intolerance.   Genitourinary:  Negative for difficulty urinating, dysuria, frequency, hematuria and urgency.   Musculoskeletal:  Negative for arthralgias, back pain, gait problem, joint swelling, myalgias and neck pain.   Skin:  Negative for rash and wound.   Allergic/Immunologic: Negative for immunocompromised state.   Neurological:  Negative for dizziness, tremors, seizures, syncope, speech difficulty, weakness, light-headedness, numbness and headaches.   Psychiatric/Behavioral:  Negative for confusion, hallucinations and sleep disturbance. The patient is not nervous/anxious.         REVIEWED INFORMATION      Current Outpatient Medications   Medication Sig

## 2024-11-19 ENCOUNTER — TELEPHONE (OUTPATIENT)
Age: 74
End: 2024-11-19

## 2024-11-19 NOTE — TELEPHONE ENCOUNTER
Patient is on Rosuvastatin 20 mg  prescribed by Dr Marcum . Dr Sullivan also prescribed Rosuvastatin 10 MG. Patient wants to know if he should take 30 MG ?   Patient is currently taking the 20 mg and he stopped taking the 10 mg last Friday.    Please Advise.

## 2025-01-04 DIAGNOSIS — I10 ESSENTIAL HYPERTENSION: Primary | ICD-10-CM

## 2025-01-06 NOTE — TELEPHONE ENCOUNTER
Miquel Cheung is calling to request a refill on the following medication(s):    Last Visit Date (If Applicable):  10/30/2024    Next Visit Date:    2/5/2025    Medication Request:  Requested Prescriptions     Pending Prescriptions Disp Refills    amLODIPine-benazepril (LOTREL) 5-20 MG per capsule [Pharmacy Med Name: amLODIPine-BENAZEPRIL 5-20 MG CAP] 90 capsule 3     Sig: Take 1 capsule by mouth daily

## 2025-01-07 RX ORDER — AMLODIPINE AND BENAZEPRIL HYDROCHLORIDE 5; 20 MG/1; MG/1
1 CAPSULE ORAL DAILY
Qty: 90 CAPSULE | Refills: 3 | Status: SHIPPED | OUTPATIENT
Start: 2025-01-07

## 2025-01-15 ENCOUNTER — OFFICE VISIT (OUTPATIENT)
Age: 75
End: 2025-01-15
Payer: MEDICARE

## 2025-01-15 VITALS
SYSTOLIC BLOOD PRESSURE: 125 MMHG | BODY MASS INDEX: 39.66 KG/M2 | WEIGHT: 267.8 LBS | HEIGHT: 69 IN | HEART RATE: 81 BPM | DIASTOLIC BLOOD PRESSURE: 70 MMHG | OXYGEN SATURATION: 94 % | TEMPERATURE: 96.8 F

## 2025-01-15 DIAGNOSIS — E11.9 TYPE 2 DIABETES MELLITUS WITHOUT COMPLICATION, WITHOUT LONG-TERM CURRENT USE OF INSULIN (HCC): ICD-10-CM

## 2025-01-15 DIAGNOSIS — M25.562 LEFT ANTERIOR KNEE PAIN: Primary | ICD-10-CM

## 2025-01-15 DIAGNOSIS — I10 ESSENTIAL HYPERTENSION: ICD-10-CM

## 2025-01-15 DIAGNOSIS — E66.01 MORBID (SEVERE) OBESITY DUE TO EXCESS CALORIES: ICD-10-CM

## 2025-01-15 PROCEDURE — 3074F SYST BP LT 130 MM HG: CPT | Performed by: PHYSICIAN ASSISTANT

## 2025-01-15 PROCEDURE — 1123F ACP DISCUSS/DSCN MKR DOCD: CPT | Performed by: PHYSICIAN ASSISTANT

## 2025-01-15 PROCEDURE — 3078F DIAST BP <80 MM HG: CPT | Performed by: PHYSICIAN ASSISTANT

## 2025-01-15 PROCEDURE — 99214 OFFICE O/P EST MOD 30 MIN: CPT | Performed by: PHYSICIAN ASSISTANT

## 2025-01-15 RX ORDER — MELOXICAM 15 MG/1
15 TABLET ORAL DAILY
Qty: 30 TABLET | Refills: 1 | Status: SHIPPED | OUTPATIENT
Start: 2025-01-15

## 2025-01-15 RX ORDER — LANCETS 33 GAUGE
1 EACH MISCELLANEOUS DAILY
Qty: 100 EACH | Refills: 3 | Status: SHIPPED | OUTPATIENT
Start: 2025-01-15

## 2025-01-15 SDOH — ECONOMIC STABILITY: FOOD INSECURITY: WITHIN THE PAST 12 MONTHS, THE FOOD YOU BOUGHT JUST DIDN'T LAST AND YOU DIDN'T HAVE MONEY TO GET MORE.: NEVER TRUE

## 2025-01-15 SDOH — ECONOMIC STABILITY: FOOD INSECURITY: WITHIN THE PAST 12 MONTHS, YOU WORRIED THAT YOUR FOOD WOULD RUN OUT BEFORE YOU GOT MONEY TO BUY MORE.: NEVER TRUE

## 2025-01-15 ASSESSMENT — PATIENT HEALTH QUESTIONNAIRE - PHQ9
SUM OF ALL RESPONSES TO PHQ QUESTIONS 1-9: 0
1. LITTLE INTEREST OR PLEASURE IN DOING THINGS: NOT AT ALL
SUM OF ALL RESPONSES TO PHQ QUESTIONS 1-9: 0
SUM OF ALL RESPONSES TO PHQ QUESTIONS 1-9: 0
2. FEELING DOWN, DEPRESSED OR HOPELESS: NOT AT ALL
SUM OF ALL RESPONSES TO PHQ9 QUESTIONS 1 & 2: 0
SUM OF ALL RESPONSES TO PHQ QUESTIONS 1-9: 0

## 2025-01-15 ASSESSMENT — ENCOUNTER SYMPTOMS
ABDOMINAL PAIN: 0
SHORTNESS OF BREATH: 0
SINUS PRESSURE: 0
WHEEZING: 0
CONSTIPATION: 0
BLOOD IN STOOL: 0
NAUSEA: 0
VOMITING: 0
SINUS PAIN: 0
EYE PAIN: 0
COUGH: 0
SORE THROAT: 0
BACK PAIN: 0
DIARRHEA: 0
RHINORRHEA: 0
EYE REDNESS: 0

## 2025-01-15 NOTE — PROGRESS NOTES
Onset    Parkinson's Disease Mother     Parkinsonism Father         PHYSICAL EXAM     /70 (Site: Left Upper Arm, Position: Sitting, Cuff Size: Large Adult)   Pulse 81   Temp 96.8 °F (36 °C) (Temporal)   Ht 1.753 m (5' 9\")   Wt 121.5 kg (267 lb 12.8 oz)   SpO2 94%   BMI 39.55 kg/m²    Physical Exam  Vitals reviewed.   Constitutional:       General: He is not in acute distress.     Appearance: Normal appearance.   HENT:      Head: Normocephalic and atraumatic.      Right Ear: Tympanic membrane and external ear normal.      Left Ear: Tympanic membrane and external ear normal.      Nose: Nose normal.      Mouth/Throat:      Mouth: Mucous membranes are moist.      Pharynx: No oropharyngeal exudate or posterior oropharyngeal erythema.   Eyes:      Extraocular Movements: Extraocular movements intact.      Conjunctiva/sclera: Conjunctivae normal.      Pupils: Pupils are equal, round, and reactive to light.   Neck:      Vascular: No carotid bruit.   Cardiovascular:      Rate and Rhythm: Normal rate and regular rhythm.      Pulses: Normal pulses.      Heart sounds: No murmur heard.     No friction rub. No gallop.   Pulmonary:      Effort: Pulmonary effort is normal. No respiratory distress.      Breath sounds: Normal breath sounds. No wheezing, rhonchi or rales.   Abdominal:      General: Bowel sounds are normal. There is no distension.      Palpations: Abdomen is soft. There is no mass.      Tenderness: There is no abdominal tenderness. There is no guarding.   Musculoskeletal:         General: Swelling present. No tenderness or deformity. Normal range of motion.      Cervical back: Normal range of motion and neck supple. No rigidity.      Right lower leg: No edema (slight swelling anterior left knee).      Left lower leg: No edema.      Comments: Crepitus left knee   Lymphadenopathy:      Cervical: No cervical adenopathy.   Skin:     General: Skin is warm.      Coloration: Skin is not jaundiced or pale.

## 2025-01-15 NOTE — TELEPHONE ENCOUNTER
Miquel Cheung is calling to request a refill on the following medication(s):    Last Visit Date (If Applicable):  10/30/2024    Next Visit Date:    3/24/2025    Medication Request:  Requested Prescriptions     Pending Prescriptions Disp Refills    Lancets (ONETOUCH DELICA PLUS [id number]) MISC [Pharmacy Med Name: ONETOUCH DELICA PLUS 33G LANCT] 100 each 3     Sig: USE AS DIRECTED TO TEST BLOOD SUGAR TWICE DAILY (MORNING AND AT BEDTIME)

## 2025-01-17 DIAGNOSIS — M25.562 LEFT ANTERIOR KNEE PAIN: ICD-10-CM

## 2025-01-30 DIAGNOSIS — E78.6 LOW HDL (UNDER 40): ICD-10-CM

## 2025-01-30 DIAGNOSIS — I10 ESSENTIAL HYPERTENSION: ICD-10-CM

## 2025-01-30 DIAGNOSIS — E11.9 TYPE 2 DIABETES MELLITUS WITHOUT COMPLICATION, WITHOUT LONG-TERM CURRENT USE OF INSULIN (HCC): ICD-10-CM

## 2025-02-04 ENCOUNTER — OFFICE VISIT (OUTPATIENT)
Age: 75
End: 2025-02-04
Payer: MEDICARE

## 2025-02-04 VITALS
HEIGHT: 69 IN | WEIGHT: 268.6 LBS | TEMPERATURE: 96.9 F | DIASTOLIC BLOOD PRESSURE: 75 MMHG | OXYGEN SATURATION: 95 % | BODY MASS INDEX: 39.78 KG/M2 | SYSTOLIC BLOOD PRESSURE: 125 MMHG | HEART RATE: 65 BPM

## 2025-02-04 DIAGNOSIS — I10 ESSENTIAL HYPERTENSION: ICD-10-CM

## 2025-02-04 DIAGNOSIS — E11.9 TYPE 2 DIABETES MELLITUS WITHOUT COMPLICATION, WITHOUT LONG-TERM CURRENT USE OF INSULIN (HCC): ICD-10-CM

## 2025-02-04 DIAGNOSIS — R21 RASH: Primary | ICD-10-CM

## 2025-02-04 PROCEDURE — 99213 OFFICE O/P EST LOW 20 MIN: CPT | Performed by: PHYSICIAN ASSISTANT

## 2025-02-04 PROCEDURE — 3074F SYST BP LT 130 MM HG: CPT | Performed by: PHYSICIAN ASSISTANT

## 2025-02-04 PROCEDURE — 3078F DIAST BP <80 MM HG: CPT | Performed by: PHYSICIAN ASSISTANT

## 2025-02-04 PROCEDURE — 1123F ACP DISCUSS/DSCN MKR DOCD: CPT | Performed by: PHYSICIAN ASSISTANT

## 2025-02-04 RX ORDER — PREDNISONE 5 MG/1
TABLET ORAL
Qty: 21 TABLET | Refills: 0 | Status: SHIPPED | OUTPATIENT
Start: 2025-02-04

## 2025-02-04 ASSESSMENT — ENCOUNTER SYMPTOMS
DIARRHEA: 0
SINUS PRESSURE: 0
WHEEZING: 0
RHINORRHEA: 0
SINUS PAIN: 0
EYE PAIN: 0
SHORTNESS OF BREATH: 0
CONSTIPATION: 0
SORE THROAT: 0
VOMITING: 0
NAUSEA: 0
BACK PAIN: 0
BLOOD IN STOOL: 0
EYE REDNESS: 0
COUGH: 0
ABDOMINAL PAIN: 0

## 2025-02-04 NOTE — PROGRESS NOTES
There is no guarding.   Musculoskeletal:         General: No swelling or deformity. Normal range of motion.      Cervical back: Normal range of motion and neck supple. No rigidity.   Lymphadenopathy:      Cervical: No cervical adenopathy.   Skin:     General: Skin is warm.      Coloration: Skin is not jaundiced or pale.      Findings: Rash (faint rash but mainly excoriations on legs, upper arms, upper chest) present. No bruising.   Neurological:      General: No focal deficit present.      Mental Status: He is alert and oriented to person, place, and time.      Cranial Nerves: No cranial nerve deficit.      Motor: No weakness.      Gait: Gait normal.      Deep Tendon Reflexes: Reflexes normal.   Psychiatric:         Mood and Affect: Mood normal.         Thought Content: Thought content normal.         The ASCVD Risk score (Niharika DK, et al., 2019) failed to calculate for the following reasons:    The valid total cholesterol range is 130 to 320 mg/dL     Results for orders placed or performed in visit on 10/01/24   Hemoglobin A1C   Result Value Ref Range    Hemoglobin A1C 6.8 %    Estimated Avg Glucose 148       ASSESSMENT/PLAN     1. Rash  -     predniSONE (DELTASONE) 5 MG tablet; 6 tabs day 1, 5 tabs day 2, 4 tabs day 3, 3 tabs day 4, 2 tabs day 5, 1 tab day 6, Disp-21 tablet, R-0Normal  -     External Referral To Dermatology  Does not look like scabies.  His wife does not have the same rash.  Does not look like a drug reaction rash.  Going to cover him with Medrol Dosepak for the dermatitis.  He requested referral to dermatology which is fine.  2. Type 2 diabetes mellitus without complication, without long-term current use of insulin ([facility])  Recent A1c of 6.8.  Monitor blood sugars while on the prednisone.  He is not on medication but is diet controlled.  3. Essential hypertension  Patient monitors his blood pressure at home.  It is stable here in the office.  Continue amlodipine benazepril 5-20 daily and

## 2025-02-24 DIAGNOSIS — I10 ESSENTIAL HYPERTENSION: ICD-10-CM

## 2025-02-24 NOTE — TELEPHONE ENCOUNTER
Miquel Cheung is calling to request a refill on the following medication(s):    Last Visit Date (If Applicable):  2/4/2025    Next Visit Date:    3/24/2025    Medication Request:  Requested Prescriptions     Pending Prescriptions Disp Refills    metoprolol succinate (TOPROL XL) 25 MG extended release tablet 90 tablet 3     Sig: Take 1 tablet by mouth daily

## 2025-02-26 RX ORDER — METOPROLOL SUCCINATE 25 MG/1
25 TABLET, EXTENDED RELEASE ORAL DAILY
Qty: 90 TABLET | Refills: 3 | Status: SHIPPED | OUTPATIENT
Start: 2025-02-26

## 2025-03-24 ENCOUNTER — OFFICE VISIT (OUTPATIENT)
Age: 75
End: 2025-03-24
Payer: MEDICARE

## 2025-03-24 VITALS
SYSTOLIC BLOOD PRESSURE: 126 MMHG | WEIGHT: 268 LBS | HEART RATE: 72 BPM | TEMPERATURE: 96.9 F | BODY MASS INDEX: 39.69 KG/M2 | OXYGEN SATURATION: 97 % | HEIGHT: 69 IN | DIASTOLIC BLOOD PRESSURE: 66 MMHG

## 2025-03-24 DIAGNOSIS — G25.81 RESTLESS LEG SYNDROME: ICD-10-CM

## 2025-03-24 DIAGNOSIS — E66.01 MORBID OBESITY: ICD-10-CM

## 2025-03-24 DIAGNOSIS — I25.10 CORONARY ARTERY DISEASE INVOLVING NATIVE CORONARY ARTERY OF NATIVE HEART WITHOUT ANGINA PECTORIS: ICD-10-CM

## 2025-03-24 DIAGNOSIS — G47.33 OSA (OBSTRUCTIVE SLEEP APNEA): ICD-10-CM

## 2025-03-24 DIAGNOSIS — E78.2 MIXED HYPERLIPIDEMIA: ICD-10-CM

## 2025-03-24 DIAGNOSIS — E11.9 TYPE 2 DIABETES MELLITUS WITHOUT COMPLICATION, WITHOUT LONG-TERM CURRENT USE OF INSULIN: ICD-10-CM

## 2025-03-24 DIAGNOSIS — B35.6 TINEA CRURIS: ICD-10-CM

## 2025-03-24 DIAGNOSIS — Z00.00 MEDICARE ANNUAL WELLNESS VISIT, SUBSEQUENT: Primary | ICD-10-CM

## 2025-03-24 DIAGNOSIS — I10 ESSENTIAL HYPERTENSION: ICD-10-CM

## 2025-03-24 DIAGNOSIS — E78.6 LOW HDL (UNDER 40): ICD-10-CM

## 2025-03-24 DIAGNOSIS — H90.3 SENSORINEURAL HEARING LOSS OF BOTH EARS: ICD-10-CM

## 2025-03-24 DIAGNOSIS — M48.061 SPINAL STENOSIS OF LUMBAR REGION WITHOUT NEUROGENIC CLAUDICATION: ICD-10-CM

## 2025-03-24 PROCEDURE — 1123F ACP DISCUSS/DSCN MKR DOCD: CPT | Performed by: INTERNAL MEDICINE

## 2025-03-24 PROCEDURE — G0439 PPPS, SUBSEQ VISIT: HCPCS | Performed by: INTERNAL MEDICINE

## 2025-03-24 PROCEDURE — 3078F DIAST BP <80 MM HG: CPT | Performed by: INTERNAL MEDICINE

## 2025-03-24 PROCEDURE — 3074F SYST BP LT 130 MM HG: CPT | Performed by: INTERNAL MEDICINE

## 2025-03-24 PROCEDURE — 99214 OFFICE O/P EST MOD 30 MIN: CPT | Performed by: INTERNAL MEDICINE

## 2025-03-24 RX ORDER — CLOBETASOL PROPIONATE 0.5 MG/G
CREAM TOPICAL
COMMUNITY
Start: 2025-02-07

## 2025-03-24 RX ORDER — CLOTRIMAZOLE AND BETAMETHASONE DIPROPIONATE 10; .64 MG/G; MG/G
CREAM TOPICAL
Qty: 45 G | Refills: 1 | Status: SHIPPED | OUTPATIENT
Start: 2025-03-24

## 2025-03-24 RX ORDER — KETOCONAZOLE 20 MG/G
CREAM TOPICAL
COMMUNITY
Start: 2025-03-21

## 2025-03-24 SDOH — ECONOMIC STABILITY: FOOD INSECURITY: WITHIN THE PAST 12 MONTHS, THE FOOD YOU BOUGHT JUST DIDN'T LAST AND YOU DIDN'T HAVE MONEY TO GET MORE.: NEVER TRUE

## 2025-03-24 SDOH — ECONOMIC STABILITY: FOOD INSECURITY: WITHIN THE PAST 12 MONTHS, YOU WORRIED THAT YOUR FOOD WOULD RUN OUT BEFORE YOU GOT MONEY TO BUY MORE.: NEVER TRUE

## 2025-03-24 ASSESSMENT — PATIENT HEALTH QUESTIONNAIRE - PHQ9
SUM OF ALL RESPONSES TO PHQ QUESTIONS 1-9: 0
2. FEELING DOWN, DEPRESSED OR HOPELESS: NOT AT ALL
SUM OF ALL RESPONSES TO PHQ QUESTIONS 1-9: 0
1. LITTLE INTEREST OR PLEASURE IN DOING THINGS: NOT AT ALL

## 2025-03-24 ASSESSMENT — ENCOUNTER SYMPTOMS
BACK PAIN: 0
SINUS PAIN: 0
SINUS PRESSURE: 0
CONSTIPATION: 0
EYE REDNESS: 0
BLOOD IN STOOL: 0
EYE PAIN: 0
WHEEZING: 0
NAUSEA: 0
COUGH: 0
SORE THROAT: 0
RHINORRHEA: 0
VOMITING: 0
SHORTNESS OF BREATH: 0
ABDOMINAL PAIN: 0
DIARRHEA: 0

## 2025-03-24 NOTE — PROGRESS NOTES
[facility]     Date of Visit:  3/24/2025  Patient Name: Miquel Cheung   Patient :  1950     CHIEF COMPLAINT:     Miquel Cheung is a 74 y.o. male who presents today for an general visit to be evaluated for the following condition(s):  Chief Complaint   Patient presents with    Medicare AWV       HISTORY OF PRESENT ILLNESS    Home blood pressures over the last 115 days averaging 125/66 with heart rate of 59.  Average blood sugar during that same time period is 140.  Using a variety of creams [facility].  Exercises 35 minutes/day usually biking or walking and he will resume swimming later on.  Sleeping well with CPAP. No CP or SOB. Taking all meds. No palpitations.     Wife at [place] for 2 years. Pt is there daily. Pt volunteers at the [facility]       REVIEW OF SYSTEMS     Review of Systems   Constitutional:  Negative for chills, fever and unexpected weight change.   HENT:  Negative for congestion, ear pain, hearing loss, rhinorrhea, sinus pressure, sinus pain, sneezing and sore throat.    Eyes:  Negative for pain, redness and visual disturbance.   Respiratory:  Negative for cough, shortness of breath and wheezing.    Cardiovascular:  Negative for chest pain, palpitations and leg swelling.   Gastrointestinal:  Negative for abdominal pain, blood in stool, constipation, diarrhea, nausea and vomiting.   Endocrine: Negative for cold intolerance and heat intolerance.   Genitourinary:  Negative for difficulty urinating, dysuria, frequency, hematuria and urgency.   Musculoskeletal:  Negative for arthralgias, back pain, gait problem, joint swelling, myalgias and neck pain.   Skin:  Negative for rash and wound.   Allergic/Immunologic: Negative for immunocompromised state.   Neurological:  Negative for dizziness, tremors, seizures, syncope, speech difficulty, weakness, light-headedness, numbness and headaches.   Psychiatric/Behavioral:  Negative for confusion, hallucinations and

## 2025-04-21 ENCOUNTER — OFFICE VISIT (OUTPATIENT)
Age: 75
End: 2025-04-21
Payer: MEDICARE

## 2025-04-21 VITALS
TEMPERATURE: 97.2 F | HEART RATE: 77 BPM | OXYGEN SATURATION: 97 % | BODY MASS INDEX: 39.04 KG/M2 | WEIGHT: 263.6 LBS | HEIGHT: 69 IN

## 2025-04-21 DIAGNOSIS — R58 ECCHYMOSIS: Primary | ICD-10-CM

## 2025-04-21 DIAGNOSIS — I10 ESSENTIAL HYPERTENSION: ICD-10-CM

## 2025-04-21 DIAGNOSIS — E11.9 TYPE 2 DIABETES MELLITUS WITHOUT COMPLICATION, WITHOUT LONG-TERM CURRENT USE OF INSULIN (HCC): ICD-10-CM

## 2025-04-21 PROCEDURE — 99213 OFFICE O/P EST LOW 20 MIN: CPT | Performed by: PHYSICIAN ASSISTANT

## 2025-04-21 PROCEDURE — 1123F ACP DISCUSS/DSCN MKR DOCD: CPT | Performed by: PHYSICIAN ASSISTANT

## 2025-04-21 ASSESSMENT — ENCOUNTER SYMPTOMS
CONSTIPATION: 0
RHINORRHEA: 1
BLOOD IN STOOL: 0
DIARRHEA: 0
BACK PAIN: 0
COLOR CHANGE: 1
VOMITING: 0
SINUS PAIN: 0
COUGH: 0
WHEEZING: 0
ABDOMINAL PAIN: 0
EYE REDNESS: 0
SORE THROAT: 0
NAUSEA: 0
EYE PAIN: 0
SINUS PRESSURE: 0
SHORTNESS OF BREATH: 0

## 2025-04-21 NOTE — PROGRESS NOTES
[facility]     Date of Visit:  2025  Patient Name: Miquel Cheung   Patient :  1950     CHIEF COMPLAINT:     Miquel Cheung is a 74 y.o. male who presents today for an general visit to be evaluated for the following condition(s):  Chief Complaint   Patient presents with    Bleeding/Bruising     Left leg       HISTORY OF PRESENT ILLNESS      Patient has had 1 week of a bruise left inner thigh and posterior thigh.  He does not recall specific injury.  It is a little sore to touch.  No falls.    Also has had a clear runny nose for several weeks.  Does not feel sick.  No sinus pain or pressure.  Some postnasal drip.  No shortness of breath.  No wheezing.  Does not feel like he has an infection.    Home blood pressures have been averaging in the 120s systolic.  Also blood sugar averaging a little bit higher lately in the 140s but he has been eating more rice lately.  He is exercising regularly.  He tried to cut out snacking.  He has lost some weight.    REVIEW OF SYSTEMS     Review of Systems   Constitutional:  Negative for chills, fever and unexpected weight change.   HENT:  Positive for postnasal drip and rhinorrhea. Negative for congestion, ear pain, hearing loss, sinus pressure, sinus pain, sneezing and sore throat.    Eyes:  Negative for pain, redness and visual disturbance.   Respiratory:  Negative for cough, shortness of breath and wheezing.    Cardiovascular:  Negative for chest pain, palpitations and leg swelling.   Gastrointestinal:  Negative for abdominal pain, blood in stool, constipation, diarrhea, nausea and vomiting.   Endocrine: Negative for cold intolerance and heat intolerance.   Genitourinary:  Negative for difficulty urinating, dysuria, frequency, hematuria and urgency.   Musculoskeletal:  Negative for arthralgias, back pain, gait problem, joint swelling, myalgias and neck pain.   Skin:  Positive for color change. Negative for rash and wound.   Allergic/Immunologic:

## 2025-04-22 ENCOUNTER — RESULTS FOLLOW-UP (OUTPATIENT)
Age: 75
End: 2025-04-22

## 2025-04-22 DIAGNOSIS — R58 ECCHYMOSIS: ICD-10-CM

## 2025-05-12 RX ORDER — METOPROLOL SUCCINATE 50 MG/1
TABLET, EXTENDED RELEASE ORAL
Qty: 90 TABLET | Refills: 3 | Status: SHIPPED | OUTPATIENT
Start: 2025-05-12 | End: 2025-05-13

## 2025-05-13 RX ORDER — METOPROLOL SUCCINATE 50 MG/1
25 TABLET, EXTENDED RELEASE ORAL DAILY
Qty: 45 TABLET | Refills: 3 | Status: SHIPPED | OUTPATIENT
Start: 2025-05-13

## 2025-05-13 NOTE — TELEPHONE ENCOUNTER
Medication clarification. When Metoprolol was last prescribed on 4/21/25 patient was taking 25mg daily. The Metoprolol that came through today was 50mg with no directions. Spoke to patient, who confirmed that he is taking 25mg daily.

## 2025-06-27 ENCOUNTER — RESULTS FOLLOW-UP (OUTPATIENT)
Age: 75
End: 2025-06-27

## 2025-06-27 DIAGNOSIS — E78.2 MIXED HYPERLIPIDEMIA: ICD-10-CM

## 2025-06-27 DIAGNOSIS — I10 ESSENTIAL HYPERTENSION: ICD-10-CM

## 2025-06-27 DIAGNOSIS — E87.5 HYPERKALEMIA: Primary | ICD-10-CM

## 2025-06-27 DIAGNOSIS — E11.9 TYPE 2 DIABETES MELLITUS WITHOUT COMPLICATION, WITHOUT LONG-TERM CURRENT USE OF INSULIN (HCC): ICD-10-CM

## 2025-06-28 DIAGNOSIS — E11.9 TYPE 2 DIABETES MELLITUS WITHOUT COMPLICATION, WITHOUT LONG-TERM CURRENT USE OF INSULIN (HCC): ICD-10-CM

## 2025-06-30 RX ORDER — BLOOD SUGAR DIAGNOSTIC
1 STRIP MISCELLANEOUS EVERY MORNING
Qty: 100 STRIP | Refills: 3 | Status: SHIPPED | OUTPATIENT
Start: 2025-06-30

## 2025-06-30 NOTE — TELEPHONE ENCOUNTER
Miquel Cheung is calling to request a refill on the following medication(s):    Last Visit Date (If Applicable):  2025    Next Visit Date:    2025    Medication Request:  Requested Prescriptions     Pending Prescriptions Disp Refills    blood glucose test strips (ONETOUCH ULTRA TEST) strip [Pharmacy Med Name: ONETOUCH ULTRA TEST STRIP] 100 strip 2     Si each by In Vitro route every morning

## 2025-07-08 ENCOUNTER — RESULTS FOLLOW-UP (OUTPATIENT)
Age: 75
End: 2025-07-08

## 2025-07-08 DIAGNOSIS — E87.5 HYPERKALEMIA: ICD-10-CM
